# Patient Record
Sex: FEMALE | Race: WHITE | NOT HISPANIC OR LATINO | URBAN - NONMETROPOLITAN AREA
[De-identification: names, ages, dates, MRNs, and addresses within clinical notes are randomized per-mention and may not be internally consistent; named-entity substitution may affect disease eponyms.]

---

## 2020-10-19 ENCOUNTER — WEB ENCOUNTER (OUTPATIENT)
Dept: URBAN - NONMETROPOLITAN AREA CLINIC 2 | Facility: CLINIC | Age: 30
End: 2020-10-19

## 2020-10-19 ENCOUNTER — DASHBOARD ENCOUNTERS (OUTPATIENT)
Age: 30
End: 2020-10-19

## 2020-10-19 ENCOUNTER — OFFICE VISIT (OUTPATIENT)
Dept: URBAN - NONMETROPOLITAN AREA CLINIC 2 | Facility: CLINIC | Age: 30
End: 2020-10-19
Payer: OTHER GOVERNMENT

## 2020-10-19 VITALS
SYSTOLIC BLOOD PRESSURE: 142 MMHG | TEMPERATURE: 96.1 F | BODY MASS INDEX: 47.09 KG/M2 | WEIGHT: 293 LBS | DIASTOLIC BLOOD PRESSURE: 100 MMHG | HEART RATE: 94 BPM | HEIGHT: 66 IN

## 2020-10-19 DIAGNOSIS — R19.7 DIARRHEA: ICD-10-CM

## 2020-10-19 DIAGNOSIS — K21.9 GERD: ICD-10-CM

## 2020-10-19 DIAGNOSIS — K59.01 CONSTIPATION: ICD-10-CM

## 2020-10-19 DIAGNOSIS — K31.84 GASTROPARESIS: ICD-10-CM

## 2020-10-19 DIAGNOSIS — Z12.11 COLON CANCER SCREENING: ICD-10-CM

## 2020-10-19 PROBLEM — 14760008 CONSTIPATION: Status: ACTIVE | Noted: 2020-10-19

## 2020-10-19 PROBLEM — 235675006 GASTROPARESIS: Status: ACTIVE | Noted: 2020-10-19

## 2020-10-19 PROBLEM — 275978004 COLON CANCER SCREENING: Status: ACTIVE | Noted: 2020-10-19

## 2020-10-19 PROBLEM — 235595009 GASTROESOPHAGEAL REFLUX DISEASE: Status: ACTIVE | Noted: 2020-10-19

## 2020-10-19 PROBLEM — 62315008 DIARRHEA: Status: ACTIVE | Noted: 2020-10-19

## 2020-10-19 PROCEDURE — G8483 FLU IMM NO ADMIN DOC REA: HCPCS | Performed by: NURSE PRACTITIONER

## 2020-10-19 PROCEDURE — 99203 OFFICE O/P NEW LOW 30 MIN: CPT | Performed by: NURSE PRACTITIONER

## 2020-10-19 PROCEDURE — G8427 DOCREV CUR MEDS BY ELIG CLIN: HCPCS | Performed by: NURSE PRACTITIONER

## 2020-10-19 PROCEDURE — 1036F TOBACCO NON-USER: CPT | Performed by: NURSE PRACTITIONER

## 2020-10-19 RX ORDER — BACLOFEN 10 MG/1
TABLET ORAL
Qty: 180 UNSPECIFIED | Status: ACTIVE | COMMUNITY

## 2020-10-19 RX ORDER — ERGOCALCIFEROL 1.25 MG/1
CAPSULE ORAL
Qty: 13 UNSPECIFIED | Status: ACTIVE | COMMUNITY

## 2020-10-19 RX ORDER — ALBUTEROL SULFATE 90 UG/1
AEROSOL, METERED RESPIRATORY (INHALATION)
Qty: 8.5 UNSPECIFIED | Status: ACTIVE | COMMUNITY

## 2020-10-19 RX ORDER — INFLUENZA A VIRUS A/VICTORIA/4897/2022 IVR-238 (H1N1) ANTIGEN (FORMALDEHYDE INACTIVATED), INFLUENZA A VIRUS A/DARWIN/9/2021 SAN-010 (H3N2) ANTIGEN (FORMALDEHYDE INACTIVATED), INFLUENZA B VIRUS B/PHUKET/3073/2013 ANTIGEN (FORMALDEHYDE INACTIVATED), AND INFLUENZA B VIRUS B/MICHIGAN/01/2021 ANTIGEN (FORMALDEHYDE INACTIVATED) 15; 15; 15; 15 UG/.5ML; UG/.5ML; UG/.5ML; UG/.5ML
INJECTION, SUSPENSION INTRAMUSCULAR
Qty: 0.5 UNSPECIFIED | Status: ACTIVE | COMMUNITY

## 2020-10-19 RX ORDER — SPIRONOLACTONE 25 MG/1
TABLET, FILM COATED ORAL
Qty: 180 UNSPECIFIED | Status: ACTIVE | COMMUNITY

## 2020-10-19 RX ORDER — FESOTERODINE FUMARATE 4 MG/1
TABLET, FILM COATED, EXTENDED RELEASE ORAL
Qty: 90 UNSPECIFIED | Status: ACTIVE | COMMUNITY

## 2020-10-19 RX ORDER — ALCAFTADINE 2.5 MG/ML
SOLUTION/ DROPS OPHTHALMIC
Qty: 3 UNSPECIFIED | Status: ACTIVE | COMMUNITY

## 2020-10-19 RX ORDER — LIFITEGRAST 50 MG/ML
1 DROP INTO AFFECTED EYE SOLUTION/ DROPS OPHTHALMIC TWICE A DAY
Status: ACTIVE | COMMUNITY

## 2020-10-19 RX ORDER — DEXLANSOPRAZOLE 60 MG/1
1 CAPSULE CAPSULE, DELAYED RELEASE ORAL ONCE A DAY
Qty: 90 CAPSULE | Refills: 3 | OUTPATIENT
Start: 2020-10-19

## 2020-10-19 RX ORDER — METHYLNALTREXONE BROMIDE 12 MG/.6ML
AS DIRECTED INJECTION, SOLUTION SUBCUTANEOUS TWICE DAILY
Qty: 60 SYRINGE | Refills: 3 | OUTPATIENT
Start: 2020-10-19 | End: 2021-02-15

## 2020-10-19 RX ORDER — AZITHROMYCIN 250 MG/1
TABLET, FILM COATED ORAL
Qty: 12 UNSPECIFIED | Status: ACTIVE | COMMUNITY

## 2020-10-19 RX ORDER — HYDRALAZINE HYDROCHLORIDE 25 MG/1
TABLET ORAL
Qty: 180 UNSPECIFIED | Status: ACTIVE | COMMUNITY

## 2020-10-19 RX ORDER — LEVOMEFOLATE/ALGAL OIL 15-90.314
CAPSULE ORAL
Qty: 90 UNSPECIFIED | Status: ACTIVE | COMMUNITY

## 2020-10-19 RX ORDER — OLOPATADINE HYDROCHLORIDE 2 MG/ML
1 DROP INTO AFFECTED EYE SOLUTION OPHTHALMIC ONCE A DAY
Status: ACTIVE | COMMUNITY

## 2020-10-19 RX ORDER — LIFITEGRAST 50 MG/ML
SOLUTION/ DROPS OPHTHALMIC
Qty: 180 UNSPECIFIED | Status: ACTIVE | COMMUNITY

## 2020-10-19 RX ORDER — DIPHENOXYLATE HYDROCHLORIDE AND ATROPINE SULFATE 2.5; .025 MG/1; MG/1
1TAB TID AC TABLET ORAL
Qty: 90 TABLET | Refills: 2 | OUTPATIENT
Start: 2020-10-19 | End: 2021-01-16

## 2020-10-19 RX ORDER — KETOCONAZOLE 20 MG/G
CREAM TOPICAL
Qty: 60 UNSPECIFIED | Status: ACTIVE | COMMUNITY

## 2020-10-19 RX ORDER — METHYLNALTREXONE BROMIDE 12 MG/.6ML
AS DIRECTED INJECTION, SOLUTION SUBCUTANEOUS
Status: ACTIVE | COMMUNITY

## 2020-10-19 RX ORDER — FENTANYL 62.5 UG/H
PATCH TRANSDERMAL
Qty: 10 UNSPECIFIED | Status: ACTIVE | COMMUNITY

## 2020-10-19 RX ORDER — OLANZAPINE 10 MG/1
TABLET, ORALLY DISINTEGRATING ORAL
Qty: 270 UNSPECIFIED | Status: ACTIVE | COMMUNITY

## 2020-10-19 RX ORDER — ACETAZOLAMIDE 500 MG/1
CAPSULE, EXTENDED RELEASE ORAL
Qty: 120 UNSPECIFIED | Status: ACTIVE | COMMUNITY

## 2020-10-19 RX ORDER — DAPAGLIFLOZIN 10 MG/1
1 TABLET TABLET, FILM COATED ORAL ONCE A DAY
Status: ACTIVE | COMMUNITY

## 2020-10-19 RX ORDER — FLAVOXATE HYDROCHLORIDE 100 MG/1
TABLET, FILM COATED ORAL
Qty: 90 UNSPECIFIED | Status: ACTIVE | COMMUNITY

## 2020-10-19 RX ORDER — SUVOREXANT 20 MG/1
1 TABLET AT BEDTIME AS NEEDED TABLET, FILM COATED ORAL ONCE A DAY
Status: ACTIVE | COMMUNITY

## 2020-10-19 RX ORDER — LEVOTHYROXINE SODIUM 75 UG/1
TABLET ORAL
Qty: 90 UNSPECIFIED | Status: ACTIVE | COMMUNITY

## 2020-10-19 RX ORDER — PINDOLOL 10 MG/1
TABLET ORAL
Qty: 360 UNSPECIFIED | Status: ACTIVE | COMMUNITY

## 2020-10-19 RX ORDER — DIPHENOXYLATE HYDROCHLORIDE AND ATROPINE SULFATE 2.5; .025 MG/1; MG/1
1 TABLET AS NEEDED TABLET ORAL
Status: ACTIVE | COMMUNITY

## 2020-10-19 RX ORDER — FUROSEMIDE 40 MG/1
TABLET ORAL
Qty: 180 UNSPECIFIED | Status: ACTIVE | COMMUNITY

## 2020-10-19 RX ORDER — GALCANEZUMAB 120 MG/ML
INJECTION, SOLUTION SUBCUTANEOUS
Qty: 1 UNSPECIFIED | Status: ACTIVE | COMMUNITY

## 2020-10-19 RX ORDER — METHENAMINE, SODIUM PHOSPHATE, MONOBASIC, MONOHYDRATE, PHENYL SALICYLATE, METHYLENE BLUE, AND HYOSCYAMINE SULFATE 118; 40.8; 36; 10; .12 MG/1; MG/1; MG/1; MG/1; MG/1
CAPSULE ORAL
Qty: 180 UNSPECIFIED | Status: ACTIVE | COMMUNITY

## 2020-10-19 RX ORDER — KETOROLAC TROMETHAMINE 10 MG/1
TABLET, FILM COATED ORAL
Qty: 60 UNSPECIFIED | Status: ACTIVE | COMMUNITY

## 2020-10-19 RX ORDER — BEPOTASTINE BESILATE 15 MG/ML
1 DROP INTO AFFECTED EYE SOLUTION/ DROPS OPHTHALMIC TWICE A DAY
Status: ACTIVE | COMMUNITY

## 2020-10-19 RX ORDER — DEXLANSOPRAZOLE 60 MG/1
CAPSULE, DELAYED RELEASE ORAL
Qty: 90 UNSPECIFIED | Status: ACTIVE | COMMUNITY

## 2020-10-19 RX ORDER — SUVOREXANT 20 MG/1
TABLET, FILM COATED ORAL
Qty: 90 UNSPECIFIED | Status: ACTIVE | COMMUNITY

## 2020-10-19 RX ORDER — POLYETHYLENE GLYCOL 3350 17 G/17G
AS DIRECTED POWDER, FOR SOLUTION ORAL
Status: ACTIVE | COMMUNITY

## 2020-10-19 RX ORDER — OXYCODONE HYDROCHLORIDE 100 MG/5ML
SOLUTION ORAL
Qty: 360 UNSPECIFIED | Status: ACTIVE | COMMUNITY

## 2020-10-19 RX ORDER — CYANOCOBALAMIN 500 UG/1
SPRAY NASAL
Qty: 12 UNSPECIFIED | Status: ACTIVE | COMMUNITY

## 2020-10-19 RX ORDER — ALPRAZOLAM 0.5 MG/1
1 TABLET TABLET ORAL TWICE A DAY
Status: ACTIVE | COMMUNITY

## 2020-10-19 RX ORDER — PHENOXYBENZAMINE HYDROCHLORIDE 10 MG/1
CAPSULE ORAL
Qty: 700 UNSPECIFIED | Status: ACTIVE | COMMUNITY

## 2020-10-19 NOTE — HPI-TODAY'S VISIT:
Mrs. Chavez is a 30-year-old female who presents to establish care for reflux, narcotic induced gastroparesis, and opioid-induced constipation.  She is previously followed by gastroenterologist in [place].  She is unfortunate patient with advanced interstitial cystitis following with palliative care at [facility] on multiple narcotics.  She currently is on a regimen that works for her, but would like to establish care in [place].  She is on Dexilant 60 mg daily, domperidone 10 mg 4 times daily AC, Relistor injection twice daily, Lomotil for overflow diarrhea occasionally, along with routine endoscopic Botox injections of her pylorus.  She has been referred to [facility] but apparently has been told that she was too sick to be seen there and that unless she was taken off palliative care could not be evaluated.  She has been referred to the [facility] as well but was recommended to see the team in [place] and has unable to be seen so far.  Today she is doing well currently and really just wants refills of her medications.  MB

## 2020-11-03 ENCOUNTER — TELEPHONE ENCOUNTER (OUTPATIENT)
Dept: URBAN - NONMETROPOLITAN AREA CLINIC 2 | Facility: CLINIC | Age: 30
End: 2020-11-03

## 2021-01-11 ENCOUNTER — OFFICE VISIT (OUTPATIENT)
Dept: URBAN - NONMETROPOLITAN AREA CLINIC 2 | Facility: CLINIC | Age: 31
End: 2021-01-11